# Patient Record
Sex: FEMALE | Race: WHITE | Employment: FULL TIME | URBAN - METROPOLITAN AREA
[De-identification: names, ages, dates, MRNs, and addresses within clinical notes are randomized per-mention and may not be internally consistent; named-entity substitution may affect disease eponyms.]

---

## 2017-01-19 PROBLEM — R31.9 HEMATURIA: Status: ACTIVE | Noted: 2017-01-19

## 2017-02-09 ENCOUNTER — HOSPITAL ENCOUNTER (EMERGENCY)
Age: 47
Discharge: HOME | End: 2017-02-09
Attending: FAMILY MEDICINE
Payer: OTHER GOVERNMENT

## 2017-02-09 VITALS
TEMPERATURE: 98 F | HEART RATE: 70 BPM | HEIGHT: 59 IN | WEIGHT: 99 LBS | BODY MASS INDEX: 19.96 KG/M2 | OXYGEN SATURATION: 99 % | RESPIRATION RATE: 20 BRPM | SYSTOLIC BLOOD PRESSURE: 128 MMHG | DIASTOLIC BLOOD PRESSURE: 94 MMHG

## 2017-02-09 DIAGNOSIS — R31.9 HEMATURIA: Primary | ICD-10-CM

## 2017-02-09 LAB
APPEARANCE UR: CLEAR
BACTERIA URNS QL MICRO: NEGATIVE /HPF
BILIRUB UR QL: NEGATIVE
COLOR UR: YELLOW
EPITH CASTS URNS QL MICRO: NORMAL /LPF (ref 0–5)
GLUCOSE UR STRIP.AUTO-MCNC: NEGATIVE MG/DL
HGB UR QL STRIP: ABNORMAL
KETONES UR QL STRIP.AUTO: NEGATIVE MG/DL
LEUKOCYTE ESTERASE UR QL STRIP.AUTO: NEGATIVE
NITRITE UR QL STRIP.AUTO: NEGATIVE
PH UR STRIP: 6.5 [PH] (ref 5–8)
PROT UR STRIP-MCNC: NEGATIVE MG/DL
RBC #/AREA URNS HPF: NORMAL /HPF (ref 0–5)
SP GR UR REFRACTOMETRY: 1.01 (ref 1–1.03)
UROBILINOGEN UR QL STRIP.AUTO: 0.2 EU/DL (ref 0.2–1)
WBC URNS QL MICRO: NORMAL /HPF (ref 0–5)

## 2017-02-09 PROCEDURE — 74011250636 HC RX REV CODE- 250/636: Performed by: FAMILY MEDICINE

## 2017-02-09 PROCEDURE — 74011250637 HC RX REV CODE- 250/637: Performed by: FAMILY MEDICINE

## 2017-02-09 PROCEDURE — 99282 EMERGENCY DEPT VISIT SF MDM: CPT

## 2017-02-09 PROCEDURE — 81001 URINALYSIS AUTO W/SCOPE: CPT | Performed by: EMERGENCY MEDICINE

## 2017-02-09 PROCEDURE — 96372 THER/PROPH/DIAG INJ SC/IM: CPT

## 2017-02-09 RX ORDER — TAMSULOSIN HYDROCHLORIDE 0.4 MG/1
0.4 CAPSULE ORAL DAILY
Qty: 5 CAP | Refills: 0 | Status: SHIPPED | OUTPATIENT
Start: 2017-02-09 | End: 2017-02-14

## 2017-02-09 RX ORDER — KETOROLAC TROMETHAMINE 30 MG/ML
30 INJECTION, SOLUTION INTRAMUSCULAR; INTRAVENOUS
Status: COMPLETED | OUTPATIENT
Start: 2017-02-09 | End: 2017-02-09

## 2017-02-09 RX ORDER — ONDANSETRON 4 MG/1
4 TABLET, ORALLY DISINTEGRATING ORAL
Status: COMPLETED | OUTPATIENT
Start: 2017-02-09 | End: 2017-02-09

## 2017-02-09 RX ORDER — ONDANSETRON 4 MG/1
4 TABLET, ORALLY DISINTEGRATING ORAL
Qty: 10 TAB | Refills: 0 | Status: SHIPPED | OUTPATIENT
Start: 2017-02-09

## 2017-02-09 RX ORDER — HYDROCODONE BITARTRATE AND ACETAMINOPHEN 5; 325 MG/1; MG/1
1 TABLET ORAL
Qty: 12 TAB | Refills: 0 | Status: SHIPPED | OUTPATIENT
Start: 2017-02-09

## 2017-02-09 RX ORDER — KETOROLAC TROMETHAMINE 10 MG/1
10 TABLET, FILM COATED ORAL
Qty: 10 TAB | Refills: 0 | Status: SHIPPED | OUTPATIENT
Start: 2017-02-09

## 2017-02-09 RX ADMIN — KETOROLAC TROMETHAMINE 30 MG: 30 INJECTION, SOLUTION INTRAMUSCULAR at 14:34

## 2017-02-09 RX ADMIN — ONDANSETRON 4 MG: 4 TABLET, ORALLY DISINTEGRATING ORAL at 14:34

## 2017-02-09 NOTE — DISCHARGE INSTRUCTIONS

## 2017-02-09 NOTE — ED PROVIDER NOTES
[address]  EMERGENCY DEPARTMENT HISTORY AND PHYSICAL EXAM       Date: 2/9/2017   Patient Name: Kemal Galvan   YOB: 1970  Medical Record Number: [id number]    History of Presenting Illness     Chief Complaint   Patient presents with    Flank Pain        History Provided By:  patient    Additional History:   2:17 PM  Kemal Galvan is a 40 y.o. female who presents to the emergency department C/O 7/10 right flank pain x 3 days. Associated sxs include nausea, vomiting, and lower abdominal \"pressure\" with urination, all of which began earlier today. She took Motrin 800 mg at home with no relief of pain. She reports a hx of kidney stones and states there are known ones currently in her left kidney. She states she was scheduled to have a cystoscopy with Dr. Tarsha Dao (Urology) this week but had to cancel because her [employer] insurance wasn't compatible. PSHx full hysterectomy. Pt denies fever, chills, or any other sxs or complaints. Primary Care Provider: Jerry Villarreal NP   Specialist:    Past History     Past Medical History:   Past Medical History   Diagnosis Date    Chronic kidney disease      Kidney stone    Kidney stone         Past Surgical History:   Past Surgical History   Procedure Laterality Date    Hx gyn       hysterectomy 2010     Pr abdomen surgery proc unlisted       adhesions    Hx appendectomy      Hx urological       stent placement        Family History:   History reviewed. No pertinent family history. Social History:   Social History   Substance Use Topics    Smoking status: Current Every Day Smoker     Packs/day: 0.50    Smokeless tobacco: None    Alcohol use Yes        Allergies:    Allergies   Allergen Reactions    Darvocet A500 [Propoxyphene N-Acetaminophen] Nausea and Vomiting    Percocet [Oxycodone-Acetaminophen] Rash    Sulfa (Sulfonamide Antibiotics) Nausea and Vomiting        Review of Systems   Review of Systems Constitutional: Negative for chills and fever. Gastrointestinal: Positive for abdominal pain (lower \"pressure,\" only with urination), nausea and vomiting. Genitourinary: Positive for flank pain (right). All other systems reviewed and are negative. Physical Exam  Vitals:    02/09/17 1344 02/09/17 1523   BP: (!) 129/100 (!) 128/94   Pulse: 71 70   Resp: 20 20   Temp: 97.7 °F (36.5 °C) 98 °F (36.7 °C)   SpO2: 98% 99%   Weight: 44.9 kg (99 lb)    Height: 4' 11\" (1.499 m)        Physical Exam   Nursing note and vitals reviewed. Vital signs and nursing notes reviewed    CONSTITUTIONAL: Alert, in mild pain. Well-developed; well-nourished. HEAD:  Normocephalic, atraumatic  EYES: PERRL; EOM's intact. ENTM: Nose: no rhinorrhea; Throat: no erythema or exudate, mucous membranes moist  Neck:  No JVD, supple without lymphadenopathy  RESP: Chest clear, equal breath sounds. CV: S1 and S2 WNL; No murmurs, gallops or rubs. GI: Normal bowel sounds, abdomen soft. Suprapubic tenderness. No masses or organomegaly. : Mild right flank tenderness. BACK:  Non-tender  UPPER EXT:  Normal inspection  LOWER EXT: No edema, no calf tenderness. Distal pulses intact. NEURO: CN intact, reflexes 2/4 and sym, strength 5/5 and sym, sensation intact. SKIN: No rashes; Normal for age and stage. PSYCH:  Alert and oriented, normal affect.      Diagnostic Study Results     Labs -      Recent Results (from the past 12 hour(s))   URINALYSIS W/ RFLX MICROSCOPIC    Collection Time: 02/09/17  1:45 PM   Result Value Ref Range    Color YELLOW      Appearance CLEAR      Specific gravity 1.011 1.005 - 1.030      pH (UA) 6.5 5.0 - 8.0      Protein NEGATIVE  NEG mg/dL    Glucose NEGATIVE  NEG mg/dL    Ketone NEGATIVE  NEG mg/dL    Bilirubin NEGATIVE  NEG      Blood MODERATE (A) NEG      Urobilinogen 0.2 0.2 - 1.0 EU/dL    Nitrites NEGATIVE  NEG      Leukocyte Esterase NEGATIVE  NEG     URINE MICROSCOPIC ONLY    Collection Time: 02/09/17  1:45 PM   Result Value Ref Range    WBC RARE 0 - 5 /hpf    RBC 1 to 3 0 - 5 /hpf    Epithelial cells FEW 0 - 5 /lpf    Bacteria NEGATIVE  NEG /hpf       Radiologic Studies -    No orders to display        Medical Decision Making   I am the first provider for this patient. I reviewed the vital signs, available nursing notes, past medical history, past surgical history, family history and social history. Vital Signs-Reviewed the patient's vital signs. Patient Vitals for the past 12 hrs:   Temp Pulse Resp BP SpO2   02/09/17 1523 98 °F (36.7 °C) 70 20 (!) 128/94 99 %   02/09/17 1344 97.7 °F (36.5 °C) 71 20 (!) 129/100 98 %     Provider Notes:     INITIAL CLINICAL IMPRESSION and PLANS:  The patient presents with the primary complaint(s) of: right flank pain. The presentation, to include historical aspects and clinical findings are consistent with the DX of UTI. However, other possible DX's to consider as primary, associated with, or exacerbated by include:    Kidney stone, chronic hematuria    Considering the above, my initial management plan to evaluate and therapeutic interventions include the following and as noted in the orders:    1. Labs: UA, Urine Microscope     ED Course:     Medications Given in the ED:  Medications   ketorolac (TORADOL) injection 30 mg (30 mg IntraMUSCular Given 2/9/17 1434)   ondansetron (ZOFRAN ODT) tablet 4 mg (4 mg Oral Given 2/9/17 1434)        PROGRESS NOTE:  2:17 PM  Initial assessment performed. Discharge Note:  3:11 PM  Pt has been reexamined. Patient has no new complaints, changes, or physical findings. Care plan outlined and precautions discussed. Results were reviewed with the patient. All medications were reviewed with the patient; will d/c home. All of pt's questions and concerns were addressed. Patient was instructed and agrees to follow up with Urology, as well as to return to the ED upon further deterioration. Patient is ready to go home.     Diagnosis   Clinical Impression:   1. Hematuria         Discussion:  Pt presented with right flank pain that began earlier today. She has a hx of kidney stones. She was in no acute distress. Urine showed trace blood cells with moderate blood. There is no evidence of infection. Will treat with Flomax, pain meds, and Zofran. She will follow up with Urology. Follow-up Information     Follow up With Details Comments Contact Info    Maylin Petty MD Call Urology follow up [address] [phone]      [facility] [place] EMERGENCY DEPT  As needed, If symptoms worsen [address] [place] [address]  [phone]          Discharge Medication List as of 2/9/2017  3:18 PM      START taking these medications    Details   ondansetron (ZOFRAN ODT) 4 mg disintegrating tablet Take 1 Tab by mouth every eight (8) hours as needed for Nausea., Normal, Disp-10 Tab, R-0      tamsulosin (FLOMAX) 0.4 mg capsule Take 1 Cap by mouth daily for 5 days. , Normal, Disp-5 Cap, R-0      HYDROcodone-acetaminophen (NORCO) 5-325 mg per tablet Take 1 Tab by mouth every four (4) hours as needed for Pain. Max Daily Amount: 6 Tabs., Print, Disp-12 Tab, R-0      ketorolac (TORADOL) 10 mg tablet Take 1 Tab by mouth every six (6) hours as needed for Pain., Normal, Disp-10 Tab, R-0         CONTINUE these medications which have NOT CHANGED    Details   pseudoephedrine (SUDAFED) 30 mg tablet Take  by mouth every four (4) hours as needed for Congestion. , Historical Med      Cetirizine (ZYRTEC) 10 mg cap Take  by mouth., Historical Med      estrogens, conjugated, (PREMARIN) 0.45 mg tablet Take 0.45 mg by mouth., Historical Med             _______________________________   Attestations: This note is prepared by Mary Donnelly, acting as a Scribe for Ester Cabrera MD on 2:24 PM on 2/9/2017 .     Ester Cabrera MD: The scribe's documentation has been prepared under my direction and personally reviewed by me in its entirety.   _______________________________

## 2017-02-09 NOTE — ED TRIAGE NOTES
C/o bilateral flank pain R>L worsened last night. Pt states was supposed to have a cystoscopy this week by Dr. Eris Mcgee due to chronic hematuria. Hx: kidney stones    Sepsis Screening completed    (  )Patient meets SIRS criteria. (x  )Patient does not meet SIRS criteria.       SIRS Criteria is achieved when two or more of the following are present   Temperature < 96.8°F (36°C) or > 100.9°F (38.3°C)   Heart Rate > 90 beats per minute   Respiratory Rate > 20 beats per minute   WBC count > 12,000 or <4,000 or > 10% bands